# Patient Record
Sex: MALE | Race: WHITE | NOT HISPANIC OR LATINO | Employment: OTHER | URBAN - METROPOLITAN AREA
[De-identification: names, ages, dates, MRNs, and addresses within clinical notes are randomized per-mention and may not be internally consistent; named-entity substitution may affect disease eponyms.]

---

## 2017-09-29 ENCOUNTER — OFFICE VISIT (OUTPATIENT)
Dept: CARDIOLOGY | Facility: CLINIC | Age: 72
End: 2017-09-29

## 2017-09-29 VITALS
HEART RATE: 50 BPM | HEIGHT: 69 IN | DIASTOLIC BLOOD PRESSURE: 70 MMHG | BODY MASS INDEX: 24.79 KG/M2 | OXYGEN SATURATION: 99 % | SYSTOLIC BLOOD PRESSURE: 118 MMHG | WEIGHT: 167.4 LBS

## 2017-09-29 DIAGNOSIS — I25.10 CORONARY ARTERY DISEASE INVOLVING NATIVE CORONARY ARTERY OF NATIVE HEART WITHOUT ANGINA PECTORIS: Primary | ICD-10-CM

## 2017-09-29 DIAGNOSIS — I10 ESSENTIAL HYPERTENSION: ICD-10-CM

## 2017-09-29 DIAGNOSIS — E78.2 MIXED HYPERLIPIDEMIA: ICD-10-CM

## 2017-09-29 PROCEDURE — 99213 OFFICE O/P EST LOW 20 MIN: CPT | Performed by: INTERNAL MEDICINE

## 2017-09-29 RX ORDER — TAMSULOSIN HYDROCHLORIDE 0.4 MG/1
1 CAPSULE ORAL DAILY
COMMUNITY
Start: 2017-09-11

## 2017-09-29 NOTE — PROGRESS NOTES
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    Chief Complaint   Patient presents with   • Ischemic heart disease       PROBLEM LIST:   1.  Ischemic heart disease:  a. Acute inferior MI (03/27/2009):    b.  Urgent catheterization (03/27/2009) single vessel CAD: total occlusion of the mid RCA reduced to 0% (FRANCES Promus stent expanded to 3.8 mm.). WGB  2. Hypertension.   3. Hyperlipidemia.  a. 9/2016: , TG 46, HDL 41, LDL 60.        4. PSA 10 9/17- pending eval  Allergies  Allergies   Allergen Reactions   • Ramipril        Current Medications    Current Outpatient Prescriptions:   •  aspirin 81 MG EC tablet, Take 81 mg by mouth daily., Disp: , Rfl:   •  atorvastatin (LIPITOR) 40 MG tablet, 40 mg daily., Disp: , Rfl:   •  metoprolol tartrate (LOPRESSOR) 25 MG tablet, 25 mg 2 (Two) Times a Day. 1/2 tablet BID, Disp: , Rfl:   •  nitroglycerin (NITROSTAT) 0.4 MG SL tablet, Place 1 tablet under the tongue every 5 (five) minutes as needed for chest pain., Disp: 25 tablet, Rfl: 11  •  tamsulosin (FLOMAX) 0.4 MG capsule 24 hr capsule, Daily., Disp: , Rfl:     History of Present Illness   Patient presents in follow-up for his ischemic heart disease and is status post remote stenting.  States that he has been doing well without interim events.  Previous follow-up with Dr. Joshua.  Walks and works in his garden at home.  Goes fishing and hunting without anginal symptoms. Pt denies any chest pain, dyspnea, dyspnea on exertion, orthopnea, PND, palpitations, lower extremity edema.  Blood pressures usually well controlled and cholesterol is followed by his primary care provider.    ROS:  All systems have been reviewed and are negative with the exception of those mentioned in the HPI.    OBJECTIVE:  Vitals:    09/29/17 1024   BP: 118/70   BP Location: Right arm   Patient Position:  "Sitting   Pulse: 50   SpO2: 99%   Weight: 167 lb 6.4 oz (75.9 kg)   Height: 69\" (175.3 cm)     Physical Exam   Constitutional: He is oriented to person, place, and time. He appears well-developed and well-nourished. No distress.   Neck: Normal range of motion. Neck supple. No hepatojugular reflux and no JVD present. Carotid bruit is not present. No tracheal deviation present. No thyromegaly present.   Cardiovascular: Normal rate, regular rhythm, S1 normal, S2 normal, intact distal pulses and normal pulses.  PMI is not displaced.  Exam reveals no gallop, no distant heart sounds, no friction rub, no midsystolic click and no opening snap.    No murmur heard.  Pulses:       Radial pulses are 2+ on the right side, and 2+ on the left side.        Dorsalis pedis pulses are 2+ on the right side, and 2+ on the left side.        Posterior tibial pulses are 2+ on the right side, and 2+ on the left side.   Pulmonary/Chest: Effort normal and breath sounds normal. He has no wheezes. He has no rales.   Abdominal: Soft. Bowel sounds are normal. He exhibits no mass. There is no tenderness. There is no guarding.   Musculoskeletal: Normal range of motion. He exhibits no edema or tenderness.   Neurological: He is alert and oriented to person, place, and time.   Psychiatric: He has a normal mood and affect. His behavior is normal.   Nursing note and vitals reviewed.      Diagnostic Data:  Procedures  Outside laboratories 9/2016:  Glucose 91 BUN 25 creatinine 0.92 sodium 143 potassium 4.2 chloride 104 CO2 23 calcium 8.9 albumin 4.0 alkaline phosphatase 50 AST 23 ALT 18  Total cholesterol 110 triglycerides 46 HDL 41 LDL 60    ASSESSMENT:   Diagnosis Plan   1. Coronary artery disease involving native coronary artery of native heart without angina pectoris     2. Essential hypertension     3. Mixed hyperlipidemia         PLAN:  1.  No new anginal equivalent.  Will provide refill nitroglycerin for emergency use.  Continue medical management " and risk reduction.  2.  Adequate control with current therapy.  Continue.  3.  Adequate control of his LDL on his current statin dose.  Continue.  Continue routine yearly checkups with his primary care provider.    Follow-up with us in 12 months or sooner when necessary.    Scribed for Ranulfo Freeman MD by Ranulfo Freeman MD. 9/29/2017  11:30 AM     James Weathers MD, thank you for referring Mr. Baltazar for evaluation.  I have forwarded my electronically generated recommendations to you for review.  Please do not hesitate to call with any questions.    I, Ranulfo Freeman MD, personally performed the services described in this documentation as scribed by the above named individual in my presence, and it is both accurate and complete.  9/29/2017  11:30 AM    Ranulfo Freeman MD, FACC

## 2018-10-05 ENCOUNTER — OFFICE VISIT (OUTPATIENT)
Dept: CARDIOLOGY | Facility: CLINIC | Age: 73
End: 2018-10-05

## 2018-10-05 VITALS
OXYGEN SATURATION: 99 % | HEIGHT: 69 IN | HEART RATE: 54 BPM | DIASTOLIC BLOOD PRESSURE: 68 MMHG | SYSTOLIC BLOOD PRESSURE: 126 MMHG | BODY MASS INDEX: 25.74 KG/M2 | WEIGHT: 173.8 LBS

## 2018-10-05 DIAGNOSIS — I10 ESSENTIAL HYPERTENSION: ICD-10-CM

## 2018-10-05 DIAGNOSIS — E78.2 MIXED HYPERLIPIDEMIA: ICD-10-CM

## 2018-10-05 DIAGNOSIS — I25.10 CORONARY ARTERY DISEASE INVOLVING NATIVE CORONARY ARTERY OF NATIVE HEART WITHOUT ANGINA PECTORIS: Primary | ICD-10-CM

## 2018-10-05 PROCEDURE — 99214 OFFICE O/P EST MOD 30 MIN: CPT | Performed by: INTERNAL MEDICINE

## 2018-10-05 NOTE — PROGRESS NOTES
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    Chief Complaint   Patient presents with   • Ischemic heart disease       PROBLEM LIST:   1.  Ischemic heart disease:  a. Acute inferior MI (03/27/2009):    b.  Urgent catheterization (03/27/2009) single vessel CAD: total occlusion of the mid RCA reduced to 0% (FRANCES Promus stent expanded to 3.8 mm.). WGB  2. Hypertension.   3. Hyperlipidemia.  a. 9/2016: , TG 46, HDL 41, LDL 60.        4. PSA 10 9/17- pending eval  Allergies  Allergies   Allergen Reactions   • Ramipril Other (See Comments)     Feeling tired       Current Medications    Current Outpatient Prescriptions:   •  aspirin 81 MG EC tablet, Take 81 mg by mouth daily., Disp: , Rfl:   •  atorvastatin (LIPITOR) 40 MG tablet, Take 40 mg by mouth Daily., Disp: , Rfl:   •  metoprolol tartrate (LOPRESSOR) 25 MG tablet, Take 25 mg by mouth 2 (Two) Times a Day. 1/2 tablet BID, Disp: , Rfl:   •  nitroglycerin (NITROSTAT) 0.4 MG SL tablet, Place 1 tablet under the tongue every 5 (five) minutes as needed for chest pain., Disp: 25 tablet, Rfl: 11  •  tamsulosin (FLOMAX) 0.4 MG capsule 24 hr capsule, Take 1 capsule by mouth Daily., Disp: , Rfl:     History of Present Illness   Patient presents in follow-up for his ischemic heart disease and is status post remote stenting.    Walks and works in his garden at home.  Goes fishing and hunting without anginal symptoms. Pt denies any chest pain, dyspnea, dyspnea on exertion, orthopnea, PND, palpitations, lower extremity edema.  Blood pressures usually well controlled and cholesterol is followed by his primary care provider.    ROS:  All systems have been reviewed and are negative with the exception of those mentioned in the HPI.    OBJECTIVE:  Vitals:    10/05/18 0950   BP: 126/68   BP  "Location: Right arm   Patient Position: Sitting   Pulse: 54   SpO2: 99%   Weight: 78.8 kg (173 lb 12.8 oz)   Height: 175.3 cm (69\")     Physical Exam   Constitutional: He is oriented to person, place, and time. He appears well-developed and well-nourished. No distress.   Neck: Normal range of motion. Neck supple. No hepatojugular reflux and no JVD present. Carotid bruit is not present. No tracheal deviation present. No thyromegaly present.   Cardiovascular: Normal rate, regular rhythm, S1 normal, S2 normal, intact distal pulses and normal pulses.  PMI is not displaced.  Exam reveals no gallop, no distant heart sounds, no friction rub, no midsystolic click and no opening snap.    No murmur heard.  Pulses:       Radial pulses are 2+ on the right side, and 2+ on the left side.        Dorsalis pedis pulses are 2+ on the right side, and 2+ on the left side.        Posterior tibial pulses are 2+ on the right side, and 2+ on the left side.   Pulmonary/Chest: Effort normal and breath sounds normal. He has no wheezes. He has no rales.   Abdominal: Soft. Bowel sounds are normal. He exhibits no mass. There is no tenderness. There is no guarding.   Musculoskeletal: Normal range of motion. He exhibits no edema or tenderness.   Neurological: He is alert and oriented to person, place, and time.   Psychiatric: He has a normal mood and affect. His behavior is normal.   Nursing note and vitals reviewed.      Diagnostic Data:  Procedures  ASSESSMENT:   Diagnosis Plan   1. Coronary artery disease involving native coronary artery of native heart without angina pectoris     2. Essential hypertension     3. Mixed hyperlipidemia         PLAN:  1.  No new anginal equivalent.  Will provide refill nitroglycerin for emergency use.  Continue medical management and risk reduction.  2.  Adequate control with current therapy.  Continue.  3.  Adequate control of his LDL on his current statin dose.  Continue.  Continue routine yearly checkups with " his primary care provider.    Follow-up with us in 12 months or sooner when necessary.     James Weathers MD, thank you for referring Mr. Baltazar for evaluation.  I have forwarded my electronically generated recommendations to you for review.  Please do not hesitate to call with any questions.     Scribed for Ranulfo Freeman MD by Madeleine Farris PA-C. 10/5/2018  10:02 AM   I, Ranulfo Freeman MD, personally performed the services described in this documentation as scribed by the above named individual in my presence, and it is both accurate and complete.  10/5/2018  10:02 AM    Ranulfo Freeman MD, FACC

## 2019-11-22 ENCOUNTER — OFFICE VISIT (OUTPATIENT)
Dept: CARDIOLOGY | Facility: CLINIC | Age: 74
End: 2019-11-22

## 2019-11-22 VITALS
SYSTOLIC BLOOD PRESSURE: 136 MMHG | DIASTOLIC BLOOD PRESSURE: 78 MMHG | OXYGEN SATURATION: 98 % | HEIGHT: 69 IN | BODY MASS INDEX: 26.51 KG/M2 | WEIGHT: 179 LBS | HEART RATE: 60 BPM

## 2019-11-22 DIAGNOSIS — I25.10 CORONARY ARTERY DISEASE INVOLVING NATIVE CORONARY ARTERY OF NATIVE HEART WITHOUT ANGINA PECTORIS: Primary | ICD-10-CM

## 2019-11-22 DIAGNOSIS — E78.2 MIXED HYPERLIPIDEMIA: ICD-10-CM

## 2019-11-22 DIAGNOSIS — I10 ESSENTIAL HYPERTENSION: ICD-10-CM

## 2019-11-22 PROCEDURE — 99214 OFFICE O/P EST MOD 30 MIN: CPT | Performed by: INTERNAL MEDICINE

## 2019-11-22 NOTE — PROGRESS NOTES
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      PROBLEM LIST:   1.  Ischemic heart disease:  a. Acute inferior MI (03/27/2009):    b.  Urgent catheterization (03/27/2009) single vessel CAD: total occlusion of the mid RCA reduced to 0% (FRANCES Promus stent expanded to 3.8 mm.). WGB  2. Hypertension.   3. Hyperlipidemia.  a. 2019 ldl 64        4. Prostate Ca- XRT 2019 [facility]    Chief Complaint   Patient presents with   • Coronary Artery Disease       Allergies  Allergies   Allergen Reactions   • Ramipril Other (See Comments)     Feeling tired       Current Medications    Current Outpatient Medications:   •  aspirin 81 MG EC tablet, Take 81 mg by mouth daily., Disp: , Rfl:   •  atorvastatin (LIPITOR) 40 MG tablet, Take 40 mg by mouth Daily., Disp: , Rfl:   •  metoprolol tartrate (LOPRESSOR) 25 MG tablet, Take 25 mg by mouth 2 (Two) Times a Day. 1/2 tablet BID, Disp: , Rfl:   •  nitroglycerin (NITROSTAT) 0.4 MG SL tablet, Place 1 tablet under the tongue every 5 (five) minutes as needed for chest pain., Disp: 25 tablet, Rfl: 11  •  tamsulosin (FLOMAX) 0.4 MG capsule 24 hr capsule, Take 1 capsule by mouth Daily., Disp: , Rfl:       History of Present Illness   Jed Baltazar is a 74 y.o. year old male here for follow up.  He continues to hunt and exercise regular basis without limitation.  He is tolerated his radiation therapy for his prostate cancer this past year without issue          OBJECTIVE:  Vitals:    11/22/19 1034   BP: 136/78   BP Location: Right arm   Patient Position: Sitting   Pulse: 60   SpO2: 98%   Weight: 81.2 kg (179 lb)   Height: 175.3 cm (69\")     Physical Exam   Constitutional: He appears well-developed and well-nourished.   Neck: Normal range of motion. Neck supple. No hepatojugular reflux " and no JVD present. Carotid bruit is not present. No tracheal deviation present. No thyromegaly present.   Cardiovascular: Normal rate, regular rhythm, S1 normal, S2 normal, intact distal pulses and normal pulses. PMI is not displaced. Exam reveals no gallop, no distant heart sounds, no friction rub, no midsystolic click and no opening snap.   No murmur heard.  Pulses:       Radial pulses are 2+ on the right side, and 2+ on the left side.        Dorsalis pedis pulses are 2+ on the right side, and 2+ on the left side.        Posterior tibial pulses are 2+ on the right side, and 2+ on the left side.   Pulmonary/Chest: Effort normal and breath sounds normal. He has no wheezes. He has no rales.   Abdominal: Soft. Bowel sounds are normal. He exhibits no mass. There is no tenderness. There is no guarding.       Diagnostic Data:  Procedures      ASSESSMENT:   Diagnosis Plan   1. Coronary artery disease involving native coronary artery of native heart without angina pectoris     2. Essential hypertension     3. Mixed hyperlipidemia         PLAN:  CAD post remote MI post PCI and stenting on 10 years following no ischemic symptoms continued medical management    Hypertension controlled metoprolol tamsulosin    Dyslipidemia on statin therapy with LDL less than 70    Low threshold for ischemic evaluation with any new symptoms    James Weathers MD, thank you for referring Mr. Baltazar for evaluation.  I have forwarded my electronically generated recommendations to you for review.  Please do not hesitate to call with any questions.      Ranulfo Freeman MD, [facility]